# Patient Record
Sex: MALE | Race: WHITE | NOT HISPANIC OR LATINO | URBAN - METROPOLITAN AREA
[De-identification: names, ages, dates, MRNs, and addresses within clinical notes are randomized per-mention and may not be internally consistent; named-entity substitution may affect disease eponyms.]

---

## 2019-12-19 ENCOUNTER — EMERGENCY (EMERGENCY)
Facility: HOSPITAL | Age: 44
LOS: 1 days | Discharge: HOME | End: 2019-12-19
Attending: EMERGENCY MEDICINE
Payer: COMMERCIAL

## 2019-12-19 VITALS
SYSTOLIC BLOOD PRESSURE: 157 MMHG | TEMPERATURE: 99 F | DIASTOLIC BLOOD PRESSURE: 98 MMHG | HEART RATE: 132 BPM | RESPIRATION RATE: 22 BRPM | OXYGEN SATURATION: 99 %

## 2019-12-19 LAB
ALBUMIN SERPL ELPH-MCNC: 4.7 G/DL — SIGNIFICANT CHANGE UP (ref 3.3–5)
ALP SERPL-CCNC: 88 U/L — SIGNIFICANT CHANGE UP (ref 40–120)
ALT FLD-CCNC: 41 U/L — SIGNIFICANT CHANGE UP (ref 4–41)
ANION GAP SERPL CALC-SCNC: 17 MMO/L — HIGH (ref 7–14)
APTT BLD: 31.6 SEC — SIGNIFICANT CHANGE UP (ref 27.5–36.3)
AST SERPL-CCNC: 37 U/L — SIGNIFICANT CHANGE UP (ref 4–40)
BASE EXCESS BLDV CALC-SCNC: 0.4 MMOL/L — SIGNIFICANT CHANGE UP
BASOPHILS # BLD AUTO: 0.03 K/UL — SIGNIFICANT CHANGE UP (ref 0–0.2)
BASOPHILS NFR BLD AUTO: 0.3 % — SIGNIFICANT CHANGE UP (ref 0–2)
BILIRUB SERPL-MCNC: 0.5 MG/DL — SIGNIFICANT CHANGE UP (ref 0.2–1.2)
BLOOD GAS VENOUS - CREATININE: 1.15 MG/DL — SIGNIFICANT CHANGE UP (ref 0.5–1.3)
BLOOD GAS VENOUS - FIO2: 21 — SIGNIFICANT CHANGE UP
BUN SERPL-MCNC: 14 MG/DL — SIGNIFICANT CHANGE UP (ref 7–23)
CALCIUM SERPL-MCNC: 9.3 MG/DL — SIGNIFICANT CHANGE UP (ref 8.4–10.5)
CHLORIDE BLDV-SCNC: 103 MMOL/L — SIGNIFICANT CHANGE UP (ref 96–108)
CHLORIDE SERPL-SCNC: 98 MMOL/L — SIGNIFICANT CHANGE UP (ref 98–107)
CO2 SERPL-SCNC: 21 MMOL/L — LOW (ref 22–31)
CREAT SERPL-MCNC: 1.07 MG/DL — SIGNIFICANT CHANGE UP (ref 0.5–1.3)
D DIMER BLD IA.RAPID-MCNC: < 150 NG/ML — SIGNIFICANT CHANGE UP
EOSINOPHIL # BLD AUTO: 0.22 K/UL — SIGNIFICANT CHANGE UP (ref 0–0.5)
EOSINOPHIL NFR BLD AUTO: 2.4 % — SIGNIFICANT CHANGE UP (ref 0–6)
GAS PNL BLDV: 137 MMOL/L — SIGNIFICANT CHANGE UP (ref 136–146)
GLUCOSE BLDV-MCNC: 123 MG/DL — HIGH (ref 70–99)
GLUCOSE SERPL-MCNC: 125 MG/DL — HIGH (ref 70–99)
HBA1C BLD-MCNC: 5.3 % — SIGNIFICANT CHANGE UP (ref 4–5.6)
HCO3 BLDV-SCNC: 23 MMOL/L — SIGNIFICANT CHANGE UP (ref 20–27)
HCT VFR BLD CALC: 46 % — SIGNIFICANT CHANGE UP (ref 39–50)
HCT VFR BLDV CALC: 48.7 % — SIGNIFICANT CHANGE UP (ref 39–51)
HGB BLD-MCNC: 16 G/DL — SIGNIFICANT CHANGE UP (ref 13–17)
HGB BLDV-MCNC: 15.9 G/DL — SIGNIFICANT CHANGE UP (ref 13–17)
IMM GRANULOCYTES NFR BLD AUTO: 0.2 % — SIGNIFICANT CHANGE UP (ref 0–1.5)
INR BLD: 1.14 — SIGNIFICANT CHANGE UP (ref 0.88–1.17)
LACTATE BLDV-MCNC: 2.5 MMOL/L — HIGH (ref 0.5–2)
LYMPHOCYTES # BLD AUTO: 2.58 K/UL — SIGNIFICANT CHANGE UP (ref 1–3.3)
LYMPHOCYTES # BLD AUTO: 28.1 % — SIGNIFICANT CHANGE UP (ref 13–44)
MCHC RBC-ENTMCNC: 31.1 PG — SIGNIFICANT CHANGE UP (ref 27–34)
MCHC RBC-ENTMCNC: 34.8 % — SIGNIFICANT CHANGE UP (ref 32–36)
MCV RBC AUTO: 89.3 FL — SIGNIFICANT CHANGE UP (ref 80–100)
MONOCYTES # BLD AUTO: 0.61 K/UL — SIGNIFICANT CHANGE UP (ref 0–0.9)
MONOCYTES NFR BLD AUTO: 6.7 % — SIGNIFICANT CHANGE UP (ref 2–14)
NEUTROPHILS # BLD AUTO: 5.71 K/UL — SIGNIFICANT CHANGE UP (ref 1.8–7.4)
NEUTROPHILS NFR BLD AUTO: 62.3 % — SIGNIFICANT CHANGE UP (ref 43–77)
NRBC # FLD: 0 K/UL — SIGNIFICANT CHANGE UP (ref 0–0)
PCO2 BLDV: 50 MMHG — SIGNIFICANT CHANGE UP (ref 41–51)
PH BLDV: 7.33 PH — SIGNIFICANT CHANGE UP (ref 7.32–7.43)
PLATELET # BLD AUTO: 345 K/UL — SIGNIFICANT CHANGE UP (ref 150–400)
PMV BLD: 9.3 FL — SIGNIFICANT CHANGE UP (ref 7–13)
PO2 BLDV: 43 MMHG — HIGH (ref 35–40)
POTASSIUM BLDV-SCNC: 3.8 MMOL/L — SIGNIFICANT CHANGE UP (ref 3.4–4.5)
POTASSIUM SERPL-MCNC: 5.1 MMOL/L — SIGNIFICANT CHANGE UP (ref 3.5–5.3)
POTASSIUM SERPL-SCNC: 5.1 MMOL/L — SIGNIFICANT CHANGE UP (ref 3.5–5.3)
PROT SERPL-MCNC: 8.3 G/DL — SIGNIFICANT CHANGE UP (ref 6–8.3)
PROTHROM AB SERPL-ACNC: 12.7 SEC — SIGNIFICANT CHANGE UP (ref 9.8–13.1)
RBC # BLD: 5.15 M/UL — SIGNIFICANT CHANGE UP (ref 4.2–5.8)
RBC # FLD: 11.9 % — SIGNIFICANT CHANGE UP (ref 10.3–14.5)
SAO2 % BLDV: 72.4 % — SIGNIFICANT CHANGE UP (ref 60–85)
SODIUM SERPL-SCNC: 136 MMOL/L — SIGNIFICANT CHANGE UP (ref 135–145)
TROPONIN T, HIGH SENSITIVITY: 13 NG/L — SIGNIFICANT CHANGE UP (ref ?–14)
TROPONIN T, HIGH SENSITIVITY: < 6 NG/L — SIGNIFICANT CHANGE UP (ref ?–14)
WBC # BLD: 9.17 K/UL — SIGNIFICANT CHANGE UP (ref 3.8–10.5)
WBC # FLD AUTO: 9.17 K/UL — SIGNIFICANT CHANGE UP (ref 3.8–10.5)

## 2019-12-19 PROCEDURE — 71046 X-RAY EXAM CHEST 2 VIEWS: CPT | Mod: 26

## 2019-12-19 PROCEDURE — 99219: CPT

## 2019-12-19 RX ORDER — ASPIRIN/CALCIUM CARB/MAGNESIUM 324 MG
81 TABLET ORAL DAILY
Refills: 0 | Status: DISCONTINUED | OUTPATIENT
Start: 2019-12-20 | End: 2019-12-28

## 2019-12-19 RX ORDER — ASPIRIN/CALCIUM CARB/MAGNESIUM 324 MG
162 TABLET ORAL ONCE
Refills: 0 | Status: COMPLETED | OUTPATIENT
Start: 2019-12-19 | End: 2019-12-19

## 2019-12-19 RX ADMIN — Medication 162 MILLIGRAM(S): at 17:36

## 2019-12-19 NOTE — ED ADULT NURSE NOTE - OBJECTIVE STATEMENT
Pt presenting to room 7 AxOx4 ambulatory at baseline with c.o dizziness, intermittent left sided CP, palpitations, SOB. PMH of HTN- not on medication. Pt states he saw a cardiologist yesterday for similar symptoms and was told to wear a Holter monitor. Pt arriving to ED with holter monitor. Pt noted to be tachycardic at 119 on monitor. Pt satting well on RA, RR even and labored at 22. Pt states "I look pale". Diaphoresis not noted. BLE presenting without swelling. Pt denying dizziness at the moment. Pt also denying fevers, chills, N/V, urinary symptoms. IV established with 20g in RAC, labs drawn and sent, MD at bedside, will continue to monitor.

## 2019-12-19 NOTE — ED PROVIDER NOTE - PHYSICAL EXAMINATION
Gen: No acute distress, alert, cooperative  HENT: Normocephalic, atraumatic.   Eyes: PERRL, EOMI    Resp: CTAB, non-labored, no wheeze  CV: tachy, no murmur  Abd: soft, NTND, no rebound or guarding  MSK: No CVAT bilaterally, no midline ttp  Skin: warm and dry  Neuro: AOx3, speech is fluent and appropriate, no focal deficits  Psych: Normal affect

## 2019-12-19 NOTE — ED ADULT TRIAGE NOTE - CHIEF COMPLAINT QUOTE
Pt c./o episode of dizziness/lightheadedness and chest pain today. States he has been having palpitations and chest pain x 1 week. Seen by a cardiologist, currently has halter monitor in place. C/o dizziness on arrival. Tachycardic in triage. Hx: HTN, GERD

## 2019-12-19 NOTE — ED PROVIDER NOTE - OBJECTIVE STATEMENT
43yo M hx htn(non-compliant with meds) presenting with chest pain/lees. Symptoms for 1 week, intermittent, left sided chest pressure, LEES, 43yo M hx htn(non-compliant with meds) presenting with chest pain/lees. Symptoms for 1 week, intermittent, left sided chest pressure, LEES, intermittent dizziness. Saw Dr. Oliva from cards 1 week ago, got a holter, told to schedule a stress. Symptoms have continued so came in today.

## 2019-12-19 NOTE — ED CDU PROVIDER INITIAL DAY NOTE - MEDICAL DECISION MAKING DETAILS
43 y/o M pmh HTN (non complaint with meds) c/o L sided CP, dizziness, palpitations, lightheadedness x 1 week.  Labs wnl, trop negative, ekg initially sinus tach, repeat NSR without intervention  In CDU for stress in AM.

## 2019-12-19 NOTE — ED PROVIDER NOTE - ATTENDING CONTRIBUTION TO CARE
Dr. Cifuentes: 45 yo male with HTN, former smoker (quit 5 days ago), in ED with 1 week of intermittent left arm heaviness/left chest pain, associated with nausea and SOB.  No palpitations or other complaints.  Symptoms non-exertional.  Saw cardiologist who placed a holter and advised pt to schedule a stress test.  Today symptoms continued and so pt came to ED.  Pt denies recent travel, immobility, extremity swelling, hormone use or personal/family history of blood clots.  On exam pt well appearing, in NAD, heart RRR, lungs CTAB, abd NTND, extremities without swelling, strength 5/5 in all extremities and skin without rash.  EKG showing tachycardia without acute ischemic changes.

## 2019-12-19 NOTE — ED CDU PROVIDER INITIAL DAY NOTE - ATTENDING CONTRIBUTION TO CARE
44M p/w CP Pt of dr dhaliwal - L side intermittent CP and SOB rad from L shoulder, was tachycardic, but dimer was neg, trop was neg.  CP was a/w dizzy and nauseous.  Pt called EMS 6 days ago, pt RMA.  Saw cards Dr Dhaliwal yest, who put pt on ?nifedipine, pt did echo.  Pt has (+)smoker, (+)fam hx, (+)HTN; mod risk for ACS.   EKG showing stach at 121 no malvin no std no twi.  pr 168 qtc 443.   Plan tele CM stress test.

## 2019-12-19 NOTE — ED CDU PROVIDER INITIAL DAY NOTE - OBJECTIVE STATEMENT
45 y/o M pmh HTN (non complaint with meds) c/o L sided CP, dizziness, palpitations, lightheadedness x 1 week. intermittent. usually with exertion. initially began as L shoulder pain that woke up pt from his sleep. recently seen cardiologist and was placed on holter monitor. symptoms became worse today and pt decided to come in. Pt's cardiologist, Dr. Oliva, also started pt on BP meds, pt is unsure which medication, believes it starts with an "N" nifedipine?  Recently quit tobacco smoking. +FH of MI in dad and grandfather. had negative stress test years ago.  Labs wnl, trop negative, ekg initially sinus tach, repeat NSR without intervention  In CDU for stress in AM.

## 2019-12-20 VITALS
RESPIRATION RATE: 16 BRPM | OXYGEN SATURATION: 100 % | DIASTOLIC BLOOD PRESSURE: 81 MMHG | SYSTOLIC BLOOD PRESSURE: 130 MMHG | TEMPERATURE: 98 F | HEART RATE: 78 BPM

## 2019-12-20 LAB
CHOLEST SERPL-MCNC: 197 MG/DL — SIGNIFICANT CHANGE UP (ref 120–199)
HDLC SERPL-MCNC: 41 MG/DL — SIGNIFICANT CHANGE UP (ref 35–55)
LIDOCAIN IGE QN: 37.4 U/L — SIGNIFICANT CHANGE UP (ref 7–60)
LIPID PNL WITH DIRECT LDL SERPL: 143 MG/DL — SIGNIFICANT CHANGE UP
TRIGL SERPL-MCNC: 69 MG/DL — SIGNIFICANT CHANGE UP (ref 10–149)
TROPONIN T, HIGH SENSITIVITY: 13 NG/L — SIGNIFICANT CHANGE UP (ref ?–14)
TSH SERPL-MCNC: 1.6 UIU/ML — SIGNIFICANT CHANGE UP (ref 0.27–4.2)

## 2019-12-20 PROCEDURE — 99217: CPT

## 2019-12-20 PROCEDURE — 78452 HT MUSCLE IMAGE SPECT MULT: CPT | Mod: 26

## 2019-12-20 PROCEDURE — 93018 CV STRESS TEST I&R ONLY: CPT | Mod: GC

## 2019-12-20 PROCEDURE — 93016 CV STRESS TEST SUPVJ ONLY: CPT | Mod: GC

## 2019-12-20 RX ADMIN — Medication 81 MILLIGRAM(S): at 11:45

## 2019-12-20 NOTE — ED CDU PROVIDER DISPOSITION NOTE - PATIENT PORTAL LINK FT
You can access the FollowMyHealth Patient Portal offered by [facility] by registering at the following website: [email or web address]. By joining [employer]’s FollowMyHealth portal, you will also be able to view your health information using other applications (apps) compatible with our system.

## 2019-12-20 NOTE — ED CDU PROVIDER DISPOSITION NOTE - NSFOLLOWUPINSTRUCTIONS_ED_ALL_ED_FT
Advance activity as tolerated.  Continue all previously prescribed medications as directed unless otherwise instructed.  Follow up with your primary care physician in 48-72 hours- bring copies of your results.  Return to the ER for worsening or persistent symptoms, and/or ANY NEW OR CONCERNING SYMPTOMS. If you have issues obtaining follow up, please call: [phone] to obtain a doctor or specialist who takes your insurance in your area.  You may call [phone] to make an appointment with the internal medicine clinic.

## 2019-12-20 NOTE — ED CDU PROVIDER DISPOSITION NOTE - ATTENDING CONTRIBUTION TO CARE
44M former cigarette smoker, hx/o HTN presented to the ED c/o chest pain / LEES.    In the ED, EKG with no acute changes; trop x 3:  <6 -----> 13 ----> 13; repeat EKGs x 2 with no  acute/ischemic/dynamic changes. Case/results d/w Dr. Oliva; pt has not had any repeat chest discomfort or other pain/discomfort throughout ED evaluation process thus far.  Pt well appearing, NAD.  Denies any chest pain or shortness of breath at this time.  heart rrr, lungs cta, normal gait.   Pt had stress test with no signs of ischemia.  Pt to be discharged with out patient cards follow up.

## 2019-12-20 NOTE — ED CDU PROVIDER SUBSEQUENT DAY NOTE - PROGRESS NOTE DETAILS
No acute events on tele since sign out this morning at 7:00am, patient with no abnormalities on stress test, patient is in NAD, VSS, no active chest pain. Pt stable to be discharged from CDU, Dr. Mccray in agreement w/ plan for d/c.

## 2019-12-20 NOTE — ED CDU PROVIDER DISPOSITION NOTE - CARE PROVIDER_API CALL
Dustin Oliva (DO)  Cardiology; Internal Medicine  [address], [address]  [place] [address]  Phone: [phone]  Fax: [phone]  Follow Up Time:

## 2019-12-20 NOTE — ED CDU PROVIDER DISPOSITION NOTE - CLINICAL COURSE
44M former cigarette smoker, hx/o HTN presented to the ED for c/o chest pain / LEES.    In the ED, EKG with no acute changes; trop x 3:  <6 -----> 13 ----> 13; repeat EKGs x 2 with no  acute/ischemic/dynamic changes. Case/results d/w Dr. Oliva; pt has not had any repeat chest discomfort or other pain/discomfort throughout ED evaluation process thus far.  Pt had stress test with no ischemic changes and was cleared for discharge with out patient cardiology follow up.

## 2019-12-20 NOTE — ED CDU PROVIDER SUBSEQUENT DAY NOTE - HISTORY
45 yo male, former cigarette smoker, hx/o HTN (was non-adherent to past unknown med regimen; had first visit with cardiologist Dr. Oliva 2 days ago and was Rx'd ?nifedipine 30 milligrams daily which pt states he started 2 days ago); pt presented to the ED for c/o chest pain / LEES.    In the ED, EKG with no acute changes; trop x 3:  <6 -----> 13 ----> 13; repeat EKGs x 2 with no  acute/ischemic/dynamic changes.  CBC/CMP/D-dimer/TSH/A1c/CXR unremarkable; lactate with initial labs was 2.5.  Case/results d/w Dr. Oliva; pt has not had any repeat chest discomfort or other pain/discomfort throughout ED evaluation process thus far; pt on tele monitoring; pending stress test this AM.

## 2019-12-20 NOTE — ED CDU PROVIDER SUBSEQUENT DAY NOTE - ATTENDING CONTRIBUTION TO CARE
44M former cigarette smoker, hx/o HTN (was non-adherent to past unknown med regimen; had first visit with cardiologist Dr. Oliva 2 days ago and was Rx'd ?nifedipine 30 milligrams daily which pt states he started 2 days ago); pt presented to the ED for c/o chest pain / LEES.    In the ED, EKG with no acute changes; trop x 3:  <6 -----> 13 ----> 13; repeat EKGs x 2 with no  acute/ischemic/dynamic changes. Case/results d/w Dr. Oliva; pt has not had any repeat chest discomfort or other pain/discomfort throughout ED evaluation process thus far.  Pt well appearing, NAD.  Denies any chest pain or shortness of breath at this time.  heart rrr, lungs cta, normal gait.  Pending stress test.

## 2019-12-20 NOTE — ED CDU PROVIDER SUBSEQUENT DAY NOTE - FAMILY HISTORY
Father  Still living? Unknown  Family history of heart failure, Age at diagnosis: Age Unknown     Grandparent  Still living? Unknown  Family history of MI (myocardial infarction), Age at diagnosis: Age Unknown

## 2020-08-01 ENCOUNTER — TRANSCRIPTION ENCOUNTER (OUTPATIENT)
Age: 45
End: 2020-08-01

## 2022-04-18 ENCOUNTER — APPOINTMENT (OUTPATIENT)
Dept: ORTHOPEDIC SURGERY | Facility: CLINIC | Age: 47
End: 2022-04-18
Payer: COMMERCIAL

## 2022-04-18 VITALS — HEIGHT: 70 IN | WEIGHT: 180 LBS | BODY MASS INDEX: 25.77 KG/M2

## 2022-04-18 PROBLEM — Z87.891 PERSONAL HISTORY OF NICOTINE DEPENDENCE: Chronic | Status: ACTIVE | Noted: 2019-12-20

## 2022-04-18 PROBLEM — I10 ESSENTIAL (PRIMARY) HYPERTENSION: Chronic | Status: ACTIVE | Noted: 2019-12-19

## 2022-04-18 PROCEDURE — 99204 OFFICE O/P NEW MOD 45 MIN: CPT

## 2022-04-18 RX ORDER — OXYCODONE AND ACETAMINOPHEN 5; 325 MG/1; MG/1
5-325 TABLET ORAL
Qty: 20 | Refills: 0 | Status: ACTIVE | COMMUNITY
Start: 2022-04-18 | End: 1900-01-01

## 2022-04-18 NOTE — HISTORY OF PRESENT ILLNESS
[de-identified] : 45 yo male presenting with left shoulder pain following a fall on 4/17/22. Patient states he fell directly onto his left side. Patient was initially seen at [facility] and he presents today in a sling. Denies any previous injuries to his left shoulder. Denies any numbness or tingling. He is unable to lift his LUE second to immoblization.

## 2022-04-18 NOTE — HISTORY OF PRESENT ILLNESS
[de-identified] : 45 yo male presenting with left shoulder pain following a fall on 4/17/22. Patient states he fell directly onto his left side. Patient was initially seen at [facility] and he presents today in a sling. Denies any previous injuries to his left shoulder. Denies any numbness or tingling. He is unable to lift his LUE second to immoblization.

## 2022-04-18 NOTE — PHYSICAL EXAM
[Left] : left shoulder [Fracture] : Fracture [de-identified] : Constitutional: The general appearance of the patient is well developed, well nourished, no deformities and well groomed. Normal \par \par Gait: Gait and function is as follows: normal gait. \par \par Skin: Head and neck visualized skin is normal. Left upper extremity visualized skin is normal. Right upper extremity visualized skin is normal. Thoracic Skin of the thoracic spine shows visualized skin is normal. \par \par Cardiovascular: palpable radial pulse bilaterally, good capillary refill in digits of the bilateral upper extremities and no temperature or color changes in the bilateral upper extremities. \par \par Lymphatic: Normal Palpation of lymph nodes in the cervical. \par \par Neurologic: fine motor control in the bilateral upper extremities is intact. Deep Tendon Reflexes in Upper and Lower Extremities Negative Ritter's in the bilateral upper extremities. The patient is oriented to time, place and person. Sensation to light touch intact in the bilateral upper extremities. Mood and Affect is normal. \par \par Right Shoulder:  Inspection of the shoulder/upper arm is as follows: There is a full, pain-free, stable range of motion of the shoulder with normal strength and no tenderness to palpation. \par \par Left Shoulder: Inspection of the shoulder/upper arm is as follows: no scapula winging, no biceps deformity and no AC joint deformity. Palpation of the shoulder/upper arm is as follows: There is tenderness at the proximal biceps tendon. Range of motion of the shoulder is as follows: ROM is limited secondary to immobilization. Strength of the shoulder is as follows: Supraspinatus 4/5. External Rotation 4/5. Internal Rotation 4/5. Deltoid 5/5 Ligament Stability and Special Tests of the shoulder is as follows: pain after strengh testing\par \par Neck: \par Inspection / Palpation of the cervical spine is as follows: There is a full, pain free, stable range of motion of the cervical spine with normal tone and no tenderness to palpation. \par \par \par Back, including spine: Inspection / Palpation of the thoracic/lumbar spine is as follows: There is a full, pain free, stable range of motion of the thoracic spine with a normal tone and not tenderness to palpation..\par

## 2022-04-18 NOTE — PHYSICAL EXAM
[Left] : left shoulder [Fracture] : Fracture [de-identified] : Constitutional: The general appearance of the patient is well developed, well nourished, no deformities and well groomed. Normal \par \par Gait: Gait and function is as follows: normal gait. \par \par Skin: Head and neck visualized skin is normal. Left upper extremity visualized skin is normal. Right upper extremity visualized skin is normal. Thoracic Skin of the thoracic spine shows visualized skin is normal. \par \par Cardiovascular: palpable radial pulse bilaterally, good capillary refill in digits of the bilateral upper extremities and no temperature or color changes in the bilateral upper extremities. \par \par Lymphatic: Normal Palpation of lymph nodes in the cervical. \par \par Neurologic: fine motor control in the bilateral upper extremities is intact. Deep Tendon Reflexes in Upper and Lower Extremities Negative Ritter's in the bilateral upper extremities. The patient is oriented to time, place and person. Sensation to light touch intact in the bilateral upper extremities. Mood and Affect is normal. \par \par Right Shoulder:  Inspection of the shoulder/upper arm is as follows: There is a full, pain-free, stable range of motion of the shoulder with normal strength and no tenderness to palpation. \par \par Left Shoulder: Inspection of the shoulder/upper arm is as follows: no scapula winging, no biceps deformity and no AC joint deformity. Palpation of the shoulder/upper arm is as follows: There is tenderness at the proximal biceps tendon. Range of motion of the shoulder is as follows: ROM is limited secondary to immobilization. Strength of the shoulder is as follows: Supraspinatus 4/5. External Rotation 4/5. Internal Rotation 4/5. Deltoid 5/5 Ligament Stability and Special Tests of the shoulder is as follows: pain after strengh testing\par \par Neck: \par Inspection / Palpation of the cervical spine is as follows: There is a full, pain free, stable range of motion of the cervical spine with normal tone and no tenderness to palpation. \par \par \par Back, including spine: Inspection / Palpation of the thoracic/lumbar spine is as follows: There is a full, pain free, stable range of motion of the thoracic spine with a normal tone and not tenderness to palpation..\par

## 2022-04-18 NOTE — DISCUSSION/SUMMARY
[Medication Risks Reviewed] : Medication risks reviewed [de-identified] : 47 yo male presenting with left shoulder pain following a fall on 4/17/22. Patient states he fell directly onto his left side. he presents today in a sling. \par x-rays today demonstrate non-displaced greater tuberosity fracture\par patient will remain in the sling x 2 weeks \par demonstrated elbow, wrist and hand movements \par Patient received a small prescription for Percocet for pain relief\par Patient was referred to Dr. Victoria in [facility]) since patient lives closer to their office for frequent follow-ups \par Discussed that patient should follow-up in the next 1 to 2 weeks for repeat x-rays.  Could consider getting MRI, likely nonsurgical however if there is any change told him surgery might be indicated.\par \par \par (1) We discussed a comprehensive treatment plans that included a prescription management plan involving the use of prescription strength medications to include Ibuprofen 600-800 mg TID, versus 500-650 mg Tylenol. We also discussed prescribing topical diclofenac (Voltaren gel) as well as once daily Meloxicam 15 mg.\par (2) The patient has More Than One chronic injuries/illnesses as outlined, discussed, and documented by ICD 10 codes listed, as well as the HPI and Plan section.\par There is a moderate risk of morbidity with further treatment, especially from use of prescription strength medications and possible side effects of these medications which include upset stomach and cardiac/renal issues with long term use were discussed.\par (3) I recommended that the patient follow-up with their medical physician to discuss any significant specific potential issues with long term use such as interactions with current medications or with exacerbation of underlying medical morbidities. \par \par Attestation:\par I, Shara Medrano , attest that this documentation has been prepared under the direction and in the presence of Provider Maninder Dominguez MD.\par The documentation recorded by the scribe, in my presence, accurately reflects the service I personally performed, and the decisions made by me with my edits as appropriate.\par Maninder Dominguez MD\par \par

## 2022-04-18 NOTE — DISCUSSION/SUMMARY
[Medication Risks Reviewed] : Medication risks reviewed [de-identified] : 47 yo male presenting with left shoulder pain following a fall on 4/17/22. Patient states he fell directly onto his left side. he presents today in a sling. \par x-rays today demonstrate non-displaced greater tuberosity fracture\par patient will remain in the sling x 2 weeks \par demonstrated elbow, wrist and hand movements \par Patient received a small prescription for Percocet for pain relief\par Patient was referred to Dr. Victoria in [facility]) since patient lives closer to their office for frequent follow-ups \par Discussed that patient should follow-up in the next 1 to 2 weeks for repeat x-rays.  Could consider getting MRI, likely nonsurgical however if there is any change told him surgery might be indicated.\par \par \par (1) We discussed a comprehensive treatment plans that included a prescription management plan involving the use of prescription strength medications to include Ibuprofen 600-800 mg TID, versus 500-650 mg Tylenol. We also discussed prescribing topical diclofenac (Voltaren gel) as well as once daily Meloxicam 15 mg.\par (2) The patient has More Than One chronic injuries/illnesses as outlined, discussed, and documented by ICD 10 codes listed, as well as the HPI and Plan section.\par There is a moderate risk of morbidity with further treatment, especially from use of prescription strength medications and possible side effects of these medications which include upset stomach and cardiac/renal issues with long term use were discussed.\par (3) I recommended that the patient follow-up with their medical physician to discuss any significant specific potential issues with long term use such as interactions with current medications or with exacerbation of underlying medical morbidities. \par \par Attestation:\par I, Shara Medrano , attest that this documentation has been prepared under the direction and in the presence of Provider Maninder Dominguez MD.\par The documentation recorded by the scribe, in my presence, accurately reflects the service I personally performed, and the decisions made by me with my edits as appropriate.\par Maninder Dominguez MD\par \par

## 2022-04-26 ENCOUNTER — APPOINTMENT (OUTPATIENT)
Dept: ORTHOPEDIC SURGERY | Facility: CLINIC | Age: 47
End: 2022-04-26
Payer: COMMERCIAL

## 2022-04-26 VITALS — HEIGHT: 70 IN | BODY MASS INDEX: 25.77 KG/M2 | WEIGHT: 180 LBS

## 2022-04-26 DIAGNOSIS — Z78.9 OTHER SPECIFIED HEALTH STATUS: ICD-10-CM

## 2022-04-26 DIAGNOSIS — Z87.891 PERSONAL HISTORY OF NICOTINE DEPENDENCE: ICD-10-CM

## 2022-04-26 DIAGNOSIS — I10 ESSENTIAL (PRIMARY) HYPERTENSION: ICD-10-CM

## 2022-04-26 PROCEDURE — 99213 OFFICE O/P EST LOW 20 MIN: CPT | Mod: 25

## 2022-04-26 PROCEDURE — 23620 CLTX GR HMRL TBRS FX WO MNPJ: CPT | Mod: LT

## 2022-04-26 PROCEDURE — 73030 X-RAY EXAM OF SHOULDER: CPT | Mod: LT

## 2022-04-26 NOTE — ASSESSMENT
[FreeTextEntry1] : L GT fx.\par Reviewed xrays.\par Continue sling, NWB.\par Ice.\par RTO 2 weeks to repeat xrays.

## 2022-04-26 NOTE — HISTORY OF PRESENT ILLNESS
[7] : 7 [6] : 6 [Full time] : Work status: full time [de-identified] : DOI 4/17/22 L shoulder\par \par 4/26/22: 47 yo RHD male with left shoulder pain since 4/17/22. He reports slipping on wet floor and landing on marble floor. He went to [facility] and was given sling. He saw Dr. Dominguez for xrays and was given Percocet. No prior injuries. \par \par 4/18/22: 47 yo male presenting with left shoulder pain following a fall on 4/17/22. Patient states he fell directly onto his left side. Patient was initially seen at [facility] and he presents today in a sling. Denies any previous injuries to his left shoulder. Denies any numbness or tingling. He is unable to lift his LUE second to immoblization.  [FreeTextEntry1] : L shoulder [FreeTextEntry3] : 4/17/22 [de-identified] : percocet, sling  [de-identified] :

## 2022-04-26 NOTE — PHYSICAL EXAM
[] : motor and sensory intact distally [Left] : left shoulder [Fracture] : Fracture [FreeTextEntry9] : not assessed due to fx [FreeTextEntry1] : Gt fx

## 2022-04-29 RX ORDER — OXYCODONE AND ACETAMINOPHEN 5; 325 MG/1; MG/1
5-325 TABLET ORAL
Qty: 20 | Refills: 0 | Status: ACTIVE | COMMUNITY
Start: 2022-04-29 | End: 1900-01-01

## 2022-05-10 ENCOUNTER — APPOINTMENT (OUTPATIENT)
Dept: ORTHOPEDIC SURGERY | Facility: CLINIC | Age: 47
End: 2022-05-10
Payer: COMMERCIAL

## 2022-05-10 VITALS — WEIGHT: 180 LBS | BODY MASS INDEX: 25.77 KG/M2 | HEIGHT: 70 IN

## 2022-05-10 DIAGNOSIS — S42.252A DISPLACED FRACTURE OF GREATER TUBEROSITY OF LEFT HUMERUS, INITIAL ENCOUNTER FOR CLOSED FRACTURE: ICD-10-CM

## 2022-05-10 DIAGNOSIS — S42.255A NONDISPLACED FRACTURE OF GREATER TUBEROSITY OF LEFT HUMERUS, INITIAL ENCOUNTER FOR CLOSED FRACTURE: ICD-10-CM

## 2022-05-10 PROCEDURE — 99024 POSTOP FOLLOW-UP VISIT: CPT

## 2022-05-10 PROCEDURE — 73030 X-RAY EXAM OF SHOULDER: CPT | Mod: LT

## 2022-05-10 NOTE — PHYSICAL EXAM
[] : motor and sensory intact distally [Left] : left shoulder [The fracture is in acceptable alignment. There is progression in healing seen] : The fracture is in acceptable alignment. There is progression in healing seen [FreeTextEntry9] : not assessed due to fx [FreeTextEntry1] : Gt fx

## 2022-05-10 NOTE — HISTORY OF PRESENT ILLNESS
[6] : 6 [7] : 7 [Full time] : Work status: full time [de-identified] : DOI 4/17/22 L shoulder\par \par 5/10/22: Here for follow up.  He is in PT with improvement. \par \par 4/26/22: 45 yo RHD male with left shoulder pain since 4/17/22. He reports slipping on wet floor and landing on marble floor. He went to [facility] and was given sling. He saw Dr. Dominguez for xrays and was given Percocet. No prior injuries. \par \par 4/18/22: 45 yo male presenting with left shoulder pain following a fall on 4/17/22. Patient states he fell directly onto his left side. Patient was initially seen at [facility] and he presents today in a sling. Denies any previous injuries to his left shoulder. Denies any numbness or tingling. He is unable to lift his LUE second to immoblization.  [FreeTextEntry1] : L shoulder [FreeTextEntry3] : 4/17/22 [de-identified] : percocet, sling  [de-identified] :

## 2022-05-10 NOTE — ASSESSMENT
[FreeTextEntry1] : L GT fx.\par Reviewed xrays.\par D/c sling in one week.\par Pt for PROM, AROM.\par NWB but ok for light ADLs. \par Ice.\par RTO 4 weeks to repeat xrays.

## 2022-05-22 ENCOUNTER — FORM ENCOUNTER (OUTPATIENT)
Age: 47
End: 2022-05-22

## 2022-06-07 ENCOUNTER — APPOINTMENT (OUTPATIENT)
Dept: ORTHOPEDIC SURGERY | Facility: CLINIC | Age: 47
End: 2022-06-07
Payer: COMMERCIAL

## 2022-06-07 VITALS — HEIGHT: 70 IN | WEIGHT: 180 LBS | BODY MASS INDEX: 25.77 KG/M2

## 2022-06-07 PROCEDURE — 99024 POSTOP FOLLOW-UP VISIT: CPT

## 2022-06-07 PROCEDURE — 73030 X-RAY EXAM OF SHOULDER: CPT | Mod: LT

## 2022-06-07 PROCEDURE — 73010 X-RAY EXAM OF SHOULDER BLADE: CPT | Mod: LT

## 2022-06-07 RX ORDER — DICLOFENAC SODIUM 1% 10 MG/G
1 GEL TOPICAL DAILY
Qty: 1 | Refills: 2 | Status: ACTIVE | COMMUNITY
Start: 2022-06-07 | End: 1900-01-01

## 2022-06-07 NOTE — HISTORY OF PRESENT ILLNESS
[7] : 7 [6] : 6 [Full time] : Work status: full time [de-identified] : DOI 4/17/22 L shoulder\par \par 6/7/22: Follow up L shoulder. There is less pain. He is in PT which helps.\par \par 5/10/22: Here for follow up.  He is in PT with improvement. \par \par 4/26/22: 47 yo RHD male with left shoulder pain since 4/17/22. He reports slipping on wet floor and landing on marble floor. He went to [facility] and was given sling. He saw Dr. Dominguez for xrays and was given Percocet. No prior injuries. \par \par 4/18/22: 47 yo male presenting with left shoulder pain following a fall on 4/17/22. Patient states he fell directly onto his left side. Patient was initially seen at [facility] and he presents today in a sling. Denies any previous injuries to his left shoulder. Denies any numbness or tingling. He is unable to lift his LUE second to immoblization.  [FreeTextEntry1] : L shoulder [FreeTextEntry3] : 4/17/22 [de-identified] : percocet, sling  [de-identified] :

## 2022-06-07 NOTE — PHYSICAL EXAM
[Left] : left shoulder [The fracture is in acceptable alignment. There is progression in healing seen] : The fracture is in acceptable alignment. There is progression in healing seen [] : no ecchymosis [FreeTextEntry9] :  P\par ER 30 [FreeTextEntry1] : Gt fx

## 2022-06-07 NOTE — ASSESSMENT
[FreeTextEntry1] : L GT fx.\par Reviewed xrays.\par PT for PROM, AROM.\par May start strengthening in PT but focus on aggressive stretching.\par HEP for stretching.\par PO NSAIDs cause GI irritation, will try diclofenac gel.\par RTO 6 weeks to repeat xrays.

## 2022-08-18 ENCOUNTER — APPOINTMENT (OUTPATIENT)
Dept: ORTHOPEDIC SURGERY | Facility: CLINIC | Age: 47
End: 2022-08-18

## 2023-01-10 ENCOUNTER — APPOINTMENT (OUTPATIENT)
Dept: ORTHOPEDIC SURGERY | Facility: CLINIC | Age: 48
End: 2023-01-10
Payer: COMMERCIAL

## 2023-01-10 VITALS — HEIGHT: 70 IN | WEIGHT: 180 LBS | BODY MASS INDEX: 25.77 KG/M2

## 2023-01-10 DIAGNOSIS — M75.42 IMPINGEMENT SYNDROME OF LEFT SHOULDER: ICD-10-CM

## 2023-01-10 PROCEDURE — 73010 X-RAY EXAM OF SHOULDER BLADE: CPT | Mod: LT

## 2023-01-10 PROCEDURE — 99214 OFFICE O/P EST MOD 30 MIN: CPT

## 2023-01-10 PROCEDURE — 73030 X-RAY EXAM OF SHOULDER: CPT | Mod: LT

## 2023-01-10 NOTE — ASSESSMENT
[FreeTextEntry1] : L GT fx.\par He is now 9 months since the fracture and has completed therapy course. He continues with pain and limitations with adls. will get an mri to evaluate any underlying structural damage will f/u after mri \par \par due to his limited range of motion will get him back into therapy and hep\par

## 2023-01-10 NOTE — IMAGING
[Left] : left shoulder [The fracture is in acceptable alignment. There is progression in healing seen] : The fracture is in acceptable alignment. There is progression in healing seen

## 2023-01-10 NOTE — PHYSICAL EXAM
[Left] : left shoulder [The fracture is in acceptable alignment. There is progression in healing seen] : The fracture is in acceptable alignment. There is progression in healing seen [Sitting] : sitting [] : pain with external rotation [FreeTextEntry9] : FE: R 160, L 140\par ER: R 50, L 35 [de-identified] : 5-/5 [FreeTextEntry1] : Gt fx [TWNoteComboBox7] : active forward flexion 145 degrees [TWNoteComboBox4] : passive forward flexion 145 degrees

## 2023-01-10 NOTE — HISTORY OF PRESENT ILLNESS
[Gradual] : gradual [Dull/Aching] : dull/aching [Radiating] : radiating [Sharp] : sharp [] : yes [Frequent] : frequent [7] : 7 [6] : 6 [Full time] : Work status: full time [de-identified] : DOI 4/17/22 L shoulder\par \par 1-10-23- Gt fracture from april. We last saw him in June. He completed therapy by June and continued limited duty at work. He continues with limited range of motion, pain and weakness. Difficulties mostly with above shoulder level activities and does wake up at night with pain.  He takes Advil occasionally. \par \par 6/7/22: Follow up L shoulder. There is less pain. He is in PT which helps.\par \par 5/10/22: Here for follow up.  He is in PT with improvement. \par \par 4/26/22: 47 yo RHD male with left shoulder pain since 4/17/22. He reports slipping on wet floor and landing on marble floor. He went to [facility] and was given sling. He saw Dr. Dominguez for xrays and was given Percocet. No prior injuries. \par \par 4/18/22: 47 yo male presenting with left shoulder pain following a fall on 4/17/22. Patient states he fell directly onto his left side. Patient was initially seen at [facility] and he presents today in a sling. Denies any previous injuries to his left shoulder. Denies any numbness or tingling. He is unable to lift his LUE second to immoblization.  [FreeTextEntry1] : L shoulder [FreeTextEntry3] : 4/17/22 [FreeTextEntry5] : patient still having loss of rom and radiating pain into bicep  [de-identified] : percocet, sling  [de-identified] :

## 2023-01-15 ENCOUNTER — FORM ENCOUNTER (OUTPATIENT)
Age: 48
End: 2023-01-15

## 2023-01-16 ENCOUNTER — APPOINTMENT (OUTPATIENT)
Dept: MRI IMAGING | Facility: CLINIC | Age: 48
End: 2023-01-16
Payer: COMMERCIAL

## 2023-01-16 PROCEDURE — 73221 MRI JOINT UPR EXTREM W/O DYE: CPT | Mod: LT

## 2023-01-24 ENCOUNTER — APPOINTMENT (OUTPATIENT)
Dept: ORTHOPEDIC SURGERY | Facility: CLINIC | Age: 48
End: 2023-01-24
Payer: COMMERCIAL

## 2023-01-24 VITALS — BODY MASS INDEX: 25.77 KG/M2 | HEIGHT: 70 IN | WEIGHT: 180 LBS

## 2023-01-24 DIAGNOSIS — S43.432D SUPERIOR GLENOID LABRUM LESION OF LEFT SHOULDER, SUBSEQUENT ENCOUNTER: ICD-10-CM

## 2023-01-24 DIAGNOSIS — M75.02 ADHESIVE CAPSULITIS OF LEFT SHOULDER: ICD-10-CM

## 2023-01-24 DIAGNOSIS — S42.255D NONDISPLACED FRACTURE OF GREATER TUBEROSITY OF LEFT HUMERUS, SUBSEQUENT ENCOUNTER FOR FRACTURE WITH ROUTINE HEALING: ICD-10-CM

## 2023-01-24 PROCEDURE — 99215 OFFICE O/P EST HI 40 MIN: CPT

## 2023-01-24 NOTE — PHYSICAL EXAM
[Sitting] : sitting [Left] : left shoulder [The fracture is in acceptable alignment. There is progression in healing seen] : The fracture is in acceptable alignment. There is progression in healing seen [] : no ecchymosis [FreeTextEntry9] : FE: R 160, L 140\par ER: R 50, L 35 [de-identified] : 5-/5 [FreeTextEntry1] : Gt fx

## 2023-01-24 NOTE — DATA REVIEWED
[MRI] : MRI [Left] : left [Shoulder] : shoulder [I independently reviewed and interpreted images and report] : I independently reviewed and interpreted images and report [FreeTextEntry1] : PRCT, SLAP tearing, inferior capsular thickening

## 2023-01-24 NOTE — HISTORY OF PRESENT ILLNESS
[4] : 4 [2] : 2 [de-identified] : DOI 4/17/22 L shoulder\par \par 1/24/23: Here for MRI review.\par \par MRI L shoulder: \par 1. AC joint arthrosis.\par 2. Infraspinatus insertional tendinopathy and articular fraying with 2 mm traction cyst at the humeral head with no fracture.\par 3. Supraspinatus tendinopathy with insertional articular fraying. Healed fracture of the greater tuberosity with spurring at the anterior insertion.\par 4. Capsular thickening anterior which can be seen with adhesive capsulitis.\par 5. Fraying and tear of the superior labrum. Biceps tendinopathy with fraying of the horizontal segment and anchor and tenosynovitis.\par \par 1-10-23- Gt fracture from april. We last saw him in June. He completed therapy by June and continued limited duty at work. He continues with limited range of motion, pain and weakness. Difficulties mostly with above shoulder level activities and does wake up at night with pain.  He takes Advil occasionally. \par \par 6/7/22: Follow up L shoulder. There is less pain. He is in PT which helps.\par \par 5/10/22: Here for follow up.  He is in PT with improvement. \par \par 4/26/22: 47 yo RHD male with left shoulder pain since 4/17/22. He reports slipping on wet floor and landing on marble floor. He went to [facility] and was given sling. He saw Dr. Dominguez for xrays and was given Percocet. No prior injuries. \par \par 4/18/22: 47 yo male presenting with left shoulder pain following a fall on 4/17/22. Patient states he fell directly onto his left side. Patient was initially seen at [facility] and he presents today in a sling. Denies any previous injuries to his left shoulder. Denies any numbness or tingling. He is unable to lift his LUE second to immoblization.  [FreeTextEntry1] : Lt shoulder [de-identified] : did not start PT that was prescribed

## 2023-01-24 NOTE — ASSESSMENT
[FreeTextEntry1] : L GT fx.\par MRI reviewed: PRCT, SLAP tearing, inferior capsular thickening.\par He does an HEP.\par He will restart PT.\par He can consider GH injection.\par RTO 2-3 months.

## 2023-08-30 ENCOUNTER — NON-APPOINTMENT (OUTPATIENT)
Age: 48
End: 2023-08-30

## 2023-11-02 ENCOUNTER — EMERGENCY (EMERGENCY)
Facility: HOSPITAL | Age: 48
LOS: 1 days | Discharge: HOME | End: 2023-11-02
Attending: EMERGENCY MEDICINE | Admitting: EMERGENCY MEDICINE
Payer: COMMERCIAL

## 2023-11-02 VITALS
OXYGEN SATURATION: 100 % | RESPIRATION RATE: 15 BRPM | DIASTOLIC BLOOD PRESSURE: 70 MMHG | HEART RATE: 73 BPM | SYSTOLIC BLOOD PRESSURE: 140 MMHG | TEMPERATURE: 98 F

## 2023-11-02 PROCEDURE — 93010 ELECTROCARDIOGRAM REPORT: CPT

## 2023-11-02 PROCEDURE — 99284 EMERGENCY DEPT VISIT MOD MDM: CPT

## 2023-11-02 RX ORDER — OMEPRAZOLE 10 MG/1
1 CAPSULE, DELAYED RELEASE ORAL
Qty: 14 | Refills: 0
Start: 2023-11-02 | End: 2023-11-15

## 2023-11-02 RX ORDER — LIDOCAINE 4 G/100G
10 CREAM TOPICAL ONCE
Refills: 0 | Status: COMPLETED | OUTPATIENT
Start: 2023-11-02 | End: 2023-11-02

## 2023-11-02 RX ADMIN — LIDOCAINE 10 MILLILITER(S): 4 CREAM TOPICAL at 01:15

## 2023-11-02 RX ADMIN — Medication 30 MILLILITER(S): at 01:15

## 2023-11-02 NOTE — ED PROVIDER NOTE - PATIENT PORTAL LINK FT
You can access the FollowMyHealth Patient Portal offered by [facility] by registering at the following website: [email or web address]. By joining [employer]’s FollowMyHealth portal, you will also be able to view your health information using other applications (apps) compatible with our system.

## 2023-11-02 NOTE — ED ADULT NURSE NOTE - NSFALLUNIVINTERV_ED_ALL_ED
Bed/Stretcher in lowest position, wheels locked, appropriate side rails in place/Call bell, personal items and telephone in reach/Instruct patient to call for assistance before getting out of bed/chair/stretcher/Non-slip footwear applied when patient is off stretcher/[place] to call system/Physically safe environment - no spills, clutter or unnecessary equipment/Purposeful proactive rounding/Room/bathroom lighting operational, light cord in reach

## 2023-11-02 NOTE — ED PROVIDER NOTE - NSFOLLOWUPINSTRUCTIONS_ED_ALL_ED_FT
Gastroesophageal reflux (BERTA) happens when acid from the stomach flows up into the tube that connects the mouth and the stomach (esophagus). Normally, food travels down the esophagus and stays in the stomach to be digested. With BERTA, food and stomach acid sometimes move back up into the esophagus.    You may have a disease called gastroesophageal reflux disease (GERD) if the reflux:  Happens often.  Causes frequent or very bad symptoms.  Causes problems such as damage to the esophagus.  When this happens, the esophagus becomes sore and swollen. Over time, GERD can make small holes (ulcers) in the lining of the esophagus.    What are the causes?  This condition is caused by a problem with the muscle between the esophagus and the stomach. When this muscle is weak or not normal, it does not close properly to keep food and acid from coming back up from the stomach.    The muscle can be weak because of:  Tobacco use.  Pregnancy.  Having a certain type of hernia (hiatal hernia).  Alcohol use.  Certain foods and drinks, such as coffee, chocolate, onions, and peppermint.  What increases the risk?  Being overweight.  Having a disease that affects your connective tissue.  Taking NSAIDs, such a ibuprofen.  What are the signs or symptoms?  Heartburn.  Difficult or painful swallowing.  The feeling of having a lump in the throat.  A bitter taste in the mouth.  Bad breath.  Having a lot of saliva.  Having an upset or bloated stomach.  Burping.  Chest pain. Different conditions can cause chest pain. Make sure you see your doctor if you have chest pain.  Shortness of breath or wheezing.  A long-term cough or a cough at night.  Wearing away of the surface of teeth (tooth enamel).  Weight loss.  How is this treated?  Making changes to your diet.  Taking medicine.  Having surgery.  Treatment will depend on how bad your symptoms are.    Follow these instructions at home:  Eating and drinking      Follow a diet as told by your doctor. You may need to avoid foods and drinks such as:  Coffee and tea, with or without caffeine.  Drinks that contain alcohol.  Energy drinks and sports drinks.  Bubbly (carbonated) drinks or sodas.  Chocolate and cocoa.  Peppermint and mint flavorings.  Garlic and onions.  Horseradish.  Spicy and acidic foods. These include peppers, chili powder, gan powder, vinegar, hot sauces, and BBQ sauce.  Citrus fruit juices and citrus fruits, such as oranges, ashwini, and limes.  Tomato-based foods. These include red sauce, chili, salsa, and pizza with red sauce.  Fried and fatty foods. These include donuts, french fries, potato chips, and high-fat dressings.  High-fat meats. These include hot dogs, rib eye steak, sausage, ham, and moura.  High-fat dairy items, such as whole milk, butter, and cream cheese.  Eat small meals often. Avoid eating large meals.  Avoid drinking large amounts of liquid with your meals.  Avoid eating meals during the 2–3 hours before bedtime.  Avoid lying down right after you eat.  Do not exercise right after you eat.  Lifestyle      Do not smoke or use any products that contain nicotine or tobacco. If you need help quitting, ask your doctor.  Try to lower your stress. If you need help doing this, ask your doctor.  If you are overweight, lose an amount of weight that is healthy for you. Ask your doctor about a safe weight loss goal.  General instructions    Pay attention to any changes in your symptoms.  Take over-the-counter and prescription medicines only as told by your doctor.  Do not take aspirin, ibuprofen, or other NSAIDs unless your doctor says it is okay.  Wear loose clothes. Do not wear anything tight around your waist.  Raise (elevate) the head of your bed about 6 inches (15 cm). You may need to use a wedge to do this.  Avoid bending over if this makes your symptoms worse.  Keep all follow-up visits.  Contact a doctor if:  You have new symptoms.  You lose weight and you do not know why.  You have trouble swallowing or it hurts to swallow.  You have wheezing or a cough that keeps happening.  You have a hoarse voice.  Your symptoms do not get better with treatment.  Get help right away if:  You have sudden pain in your arms, neck, jaw, teeth, or back.  You suddenly feel sweaty, dizzy, or light-headed.  You have chest pain or shortness of breath.  You vomit and the vomit is green, yellow, or black, or it looks like blood or coffee grounds.  You faint.  Your poop (stool) is red, bloody, or black.  You cannot swallow, drink, or eat.  These symptoms may represent a serious problem that is an emergency. Do not wait to see if the symptoms will go away. Get medical help right away. Call your local emergency services (911 in the [place] Do not drive yourself to the hospital.    Summary  If a person has gastroesophageal reflux disease (GERD), food and stomach acid move back up into the esophagus and cause symptoms or problems such as damage to the esophagus.  Treatment will depend on how bad your symptoms are.  Follow a diet as told by your doctor.  Take all medicines only as told by your doctor.  This information is not intended to replace advice given to you by your health care provider. Make sure you discuss any questions you have with your health care provider.

## 2023-11-02 NOTE — ED PROVIDER NOTE - ATTENDING CONTRIBUTION TO CARE
47-year-old male past medical history of GERD and hypertension presenting with upper abdominal discomfort for the last 2 weeks.  It radiates up towards his mid chest and throat and is a burning sensation.  Patient had an upper GI 2 years ago and was diagnosed with GERD.  Patient is currently taking twice daily PPI.    Vitals: I have reviewed the patients vital signs  General: nontoxic appearing  HEENT: Atraumatic, normocephalic, airway patent  Eyes: EOMI, tracking appropriately  Neck: no tracheal deviation  Chest/Lungs: no trauma, symmetric chest rise, speaking in complete sentences,  no resp distress  Heart: skin and extremities well perfused, regular rate and rhythm  Neuro: A+Ox3, appears non focal  MSK: no deformities  Skin: no cyanosis, no jaundice   Psych:  Normal mood and affect    47-year-old male past with history of hypertension and GERD presenting with abdominal discomfort.  Differential includes but is not limited to reflux, gastritis, peptic ulcer disease, pancreatitis.  Based on the description it does sound that this is a problem with GERD.  We will treat symptomatically and refer back to his GI for further management or possible new endoscopy consider laboratory studies and imaging but no indication at this time

## 2023-11-02 NOTE — ED PROVIDER NOTE - CLINICAL SUMMARY MEDICAL DECISION MAKING FREE TEXT BOX
47-year-old male past medical history of hypertension, GERD, smoker 1/2 pack/d, alcohol use (over weekends) Sleep apnea adv. CPAP but non-compliant, now resenting with upper abdominal discomfort, radiating to mid chest, and throat. Patient describes discomfort being a burning sensation, has been having abnormal taste sensation in mouth. Hemodyn stable. On exam pain on epigastric palpation. Likely GERD, will give Antacids. Other D/Ds include but less likely pancreatitis as history/exam not typically consistent, patient reporting 1 x manjula episode, currently RAFAEL -ve for blood, would req outpatient GI for UGI/LGI endoscopy +/- biopsy. Patient having malar rash, will suggest [facility] follow up for further eval.

## 2023-11-02 NOTE — ED PROVIDER NOTE - NS ED ROS FT
CONSTITUTIONAL: No weakness, fevers   EYES/ENT: +ve throat pain   NECK: No pain  RESPIRATORY: +ve cough with phlegm   CARDIOVASCULAR: RRR, S1+S2 wnl   ABDOMINAL: +ve upper abdominal pain/burning, 1 episode of black stool on Sunday resolved since  GENITOURINARY: No dysuria, frequency   NEUROLOGICAL: No numbness or weakness  SKIN: +ve malar rash on sun exposure     All other review of systems is negative unless indicated above.

## 2023-11-02 NOTE — ED PROVIDER NOTE - NSICDXFAMILYHX_GEN_ALL_CORE_FT
FAMILY HISTORY:  Father  Still living? Unknown  Family history of heart failure, Age at diagnosis: Age Unknown    Grandparent  Still living? Unknown  Family history of MI (myocardial infarction), Age at diagnosis: Age Unknown

## 2023-11-02 NOTE — ED PROVIDER NOTE - OBJECTIVE STATEMENT
47-year-old male past medical history of hypertension, GERD, smoker 1/2 pack/d, alcohol use (over weekends) Sleep apnea adv. CPAP but non-compliant, now resenting with upper abdominal discomfort, radiating to mid chest, and throat. Patient describes discomfort being a burning sensation, has been having abnormal taste sensation in mouth for 2 weeks.   On Sunday, had 1 episode of black stool, denies lightheadedness, syncope, chest pain, palpitations.  Patient has been passing normal stools since.   Of note reports 20 pounds weight loss in 2 months, has had a upper GI/lower GI endoscopy 2 years ago inconclusive of IBD, patient reporting having a borderline elevated CONY at that time, was in follow-up with [facility] but had never clear diagnosis.  Patient reports developing malar rash from sun exposure which resolved after using hydrocortisone cream.  Reports father having scleroderma

## 2023-11-02 NOTE — ED PROVIDER NOTE - PROGRESS NOTE DETAILS
Gildardo TEJADA: Patient re-evaluated and is feeling improved.  Patient is stable for discharge. Will order mylanta for pharmacy, patient requesting to include omeprazole. Will follow up with GI and [facility]. Follow up instructions given, importance of follow up emphasized, return to ED parameters reviewed and patient verbalized understanding.  All questions answered, all concerns addressed.

## 2023-11-02 NOTE — ED PROVIDER NOTE - NSFOLLOWUPCLINICS_GEN_ALL_ED_FT
Gastroenterology at [facility]  Gastroenterology  [address] [place] [address]  Phone: [phone]  Fax:     [facility]  Gastroenterology  [address]  [place] [address]  Phone: [phone]  Fax:     [facility]  Rheumatology  [address]  [place] [address]  Phone: [phone]  Fax:

## 2023-11-02 NOTE — ED ADULT NURSE NOTE - OBJECTIVE STATEMENT
received rm14. A&OX4 RR even unlabored completing full sentences. pt presents c/o mid-sternal chest pain and burning sensation x1 month. past medical hx HTN. pt describes pain as burning "heart burn like" sensation. pt does not follow with  GI. pt well appearing. denies h/a, dizziness/lightheadedness, n/v/d, fevers/chills, falls/trauma, sob. pt medicated per orders. strectcher lowest position siderails up safety measures maintained.

## 2023-11-02 NOTE — ED PROVIDER NOTE - PHYSICAL EXAMINATION
PHYSICAL EXAM:  GENERAL: NAD, lying in bed comfortably  HEAD:  Normocephalic  EYES: EOMI, PERRLA, conjunctiva and sclera clear  ENT: Poor oral/dental hygiene, nrml tonsils   NECK: Supple  LUNG: CTA b/l  HEART: RRR, +S1/S2  ABDOMEN: soft, tender on palpation of epigastrium, no mass palpable, BS audible, RAFAEL -ve for blood   EXTREMITIES:  2+ Peripheral Pulses, brisk cap refill.   NERVOUS SYSTEM:  AAOx3, speech clear. No sensory/motor deficits   SKIN: No active rashes or lesions

## 2023-11-04 ENCOUNTER — APPOINTMENT (OUTPATIENT)
Dept: CT IMAGING | Facility: CLINIC | Age: 48
End: 2023-11-04